# Patient Record
Sex: FEMALE | Race: WHITE | NOT HISPANIC OR LATINO | URBAN - METROPOLITAN AREA
[De-identification: names, ages, dates, MRNs, and addresses within clinical notes are randomized per-mention and may not be internally consistent; named-entity substitution may affect disease eponyms.]

---

## 2020-06-11 ENCOUNTER — LAB OUTSIDE AN ENCOUNTER (OUTPATIENT)
Dept: URBAN - METROPOLITAN AREA CLINIC 86 | Facility: CLINIC | Age: 70
End: 2020-06-11

## 2020-06-12 LAB
A/G RATIO: 1.8
ALBUMIN: 4.6
ALKALINE PHOSPHATASE: 88
ALT (SGPT): 25
AST (SGOT): 25
BASO (ABSOLUTE): 0
BASOS: 0
BILIRUBIN, TOTAL: 0.7
BUN/CREATININE RATIO: 25
BUN: 18
CALCIUM: 9.6
CARBON DIOXIDE, TOTAL: 26
CHLORIDE: 100
CREATININE: 0.72
EGFR IF AFRICN AM: 99
EGFR IF NONAFRICN AM: 86
EOS (ABSOLUTE): 0.2
EOS: 2
GLOBULIN, TOTAL: 2.6
GLUCOSE: 93
HEMATOCRIT: 36
HEMATOLOGY COMMENTS:: (no result)
HEMOGLOBIN: 12
IMMATURE CELLS: (no result)
IMMATURE GRANS (ABS): 0
IMMATURE GRANULOCYTES: 0
LYMPHS (ABSOLUTE): 2.1
LYMPHS: 30
MCH: 32.2
MCHC: 33.3
MCV: 97
MONOCYTES(ABSOLUTE): 0.9
MONOCYTES: 13
NEUTROPHILS (ABSOLUTE): 3.8
NEUTROPHILS: 55
NRBC: (no result)
PLATELETS: 225
POTASSIUM: 3.9
PROTEIN, TOTAL: 7.2
RBC: 3.73
RDW: 14
SODIUM: 141
WBC: 7.1

## 2020-06-17 ENCOUNTER — OFFICE VISIT (OUTPATIENT)
Dept: URBAN - METROPOLITAN AREA CLINIC 92 | Facility: CLINIC | Age: 70
End: 2020-06-17

## 2020-06-17 NOTE — HPI-TODAY'S VISIT:
This is a scheduled follow-up appointment for this patient, a 69 year old /White female, after a previous visit on 05 2019, for an evaluation for abnormal mri with liver lesions felt related to prior hormonal medication use.  The patient relates no significant family or personal history of liver disease. She states no history of new medications or alcohol use. The patient reports a personal history of no other habits that could cause liver damage.      Pt here for follow up. she is going to stop gleevac at the end of this year. she has one adenoma and 3 fnh's which have been stable since her last scan.  she was lost to follow up for 2 years.  her lesions are stable.  will plan for another mri in 1 year and labs at that time.   4/10/19 labs wbc 6.6 hgb 11.1 plt 198 gluc 100 cr 0.84 tb 0.6 alp 83 ast 20 alt 9 april 2019 mri  * Final Report *  Reason For Exam ADENOMA OF LIVER  REPORT EXAM: MRI Abdomen w/ + w/o Contrast  CLINICAL INDICATION: ADENOMA OF LIVER.  TECHNIQUE: Multisequence, multiplanar MRI of the abdomen was performed without and with intravenous contrast. ESRC.2.7.3   CONTRAST: 13 cc of Prohance  COMPARISON: 4/29/2017 and 10/15/2016  FINDINGS:  Lower Thorax: Normal.  Liver: No fat or iron. Normal hepatic morphology. No new worrisome hepatic mass. Lesions as follows:   1. Segment 8, stable arterially enhancing 0.5 x 0.6 cm lesion with delayed phase isointensity (series 10; image 19) post compatible with FNH. 2. Stable Segment 8 arterially enhancing lesion measuring 1.6 x 1.7 cm (series 10; image 24), previously 1.5 x 1.9 cm, with washout and with signal loss on out of phase imaging compatible with adenoma. 3. Stable segment 2 subcapsular arterial focus of enhancement 0.7 x 0.5 cm (series 10 image 29), likely a FNH.  4. Left lateral segment ill-defined focus of arterial enhancement which equilibrates on delayed imaging remains unchanged measuring 0.7 x 0.7 cm, (series 10; image 38), likely a FNH.  Gallbladder/Biliary Tree: Normal.  Spleen: Normal.  Pancreas: Normal.  Adrenal Glands: Normal.   Kidneys/Ureters: Subcentimeter cysts..  Gastrointestinal: Normal.   Lymph Nodes: Normal.  Vessels: Normal.  Peritoneum/Retroperitoneum: Normal.  Bones/Soft Tissues: Normal.  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   Signature Line *** Final ***  Electronically Signed By:  Nahomy Maldonado on  04/20/2019 12:20  Dictated by:  Nahomy Maldonado  Stressed to pt the need for social distancing and strict handwashing and wearing a mask and to follow any other new or added CDC recommendations as this is an evolving target.  Duration of visit 25 mins with over 50% of the time explaining pt's condition and treatment plan

## 2020-07-27 ENCOUNTER — OFFICE VISIT (OUTPATIENT)
Dept: URBAN - METROPOLITAN AREA TELEHEALTH 2 | Facility: TELEHEALTH | Age: 70
End: 2020-07-27
Payer: MEDICARE

## 2020-07-27 DIAGNOSIS — K82.8 GALLBLADDER SLUDGE: ICD-10-CM

## 2020-07-27 DIAGNOSIS — Z79.899 HIGH RISK MEDICATION USE: ICD-10-CM

## 2020-07-27 DIAGNOSIS — Z98.89 HX OF COLONOSCOPY: ICD-10-CM

## 2020-07-27 DIAGNOSIS — K76.89 LIVER LESION: ICD-10-CM

## 2020-07-27 DIAGNOSIS — E03.9 HYPOTHYROIDISM: ICD-10-CM

## 2020-07-27 DIAGNOSIS — E55.9 VITAMIN D DEFICIENCY: ICD-10-CM

## 2020-07-27 DIAGNOSIS — Z71.89 VACCINE COUNSELING: ICD-10-CM

## 2020-07-27 DIAGNOSIS — D13.4 ADENOMA OF LIVER: ICD-10-CM

## 2020-07-27 DIAGNOSIS — C92.10 CML (CHRONIC MYELOCYTIC LEUKEMIA): ICD-10-CM

## 2020-07-27 PROCEDURE — 3017F COLORECTAL CA SCREEN DOC REV: CPT

## 2020-07-27 PROCEDURE — G8427 DOCREV CUR MEDS BY ELIG CLIN: HCPCS

## 2020-07-27 PROCEDURE — 1036F TOBACCO NON-USER: CPT

## 2020-07-27 PROCEDURE — 99214 OFFICE O/P EST MOD 30 MIN: CPT

## 2020-07-27 PROCEDURE — G9903 PT SCRN TBCO ID AS NON USER: HCPCS

## 2020-07-27 PROCEDURE — G8420 CALC BMI NORM PARAMETERS: HCPCS

## 2020-07-27 RX ORDER — LEVOTHYROXINE SODIUM 88 UG/1
1 TABLET IN THE MORNING ON AN EMPTY STOMACH TABLET ORAL ONCE A DAY
Status: ACTIVE | COMMUNITY

## 2020-07-27 RX ORDER — IMATINIB MESYLATE 400 MG/1
TAKE 1 TABLET (400 MG) BY ORAL ROUTE ONCE DAILY WITH MEAL(S) AND A LARGE GLASS OF WATER TABLET ORAL 1
Qty: 0 | Refills: 0 | Status: DISCONTINUED | COMMUNITY
Start: 1900-01-01

## 2020-07-27 NOTE — HPI-TODAY'S VISIT:
This is a scheduled follow-up appointment for this patient, a 69 year old /White female, after a previous visit on 2019, for an evaluation for abnormal mri with liver lesions felt related to prior hormonal medication use.   The patient relates no significant family or personal history of liver disease. She states no history of new medications or alcohol use. The patient reports a personal history of no other habits that could cause liver damage.    Pt here for follow up. she is going to stop gleevac at the end of this year. she has one adenoma and 3 fnh's which have been stable since her last scan.  She was lost to follow up for 2 years.  Her lesions are stable.   2020 ca 9.6 and glu 93 and bun 18 and tp 7.2 alb 4.6 and tb 0.7 and alk 88 and ast 25 and k 3.9 and na 141 cl 100 and cr 0.72 and alt 25 and co2 26.  wbc 7.1 and hg 12 and plat 225.  No new meds and in fact off meds.  2019 and wbc 6.6 and hg 11.1 and plat 198 and glu 100 and cr 0.84 and na 140 and k 3.7 and cl 100 and co2 23 and alb 4.4 and tb 0.6 and alk 83 and ast 20 and alt 9.   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   Document Type:	MRI Abdomen w/ + w/o Contrast Document Date:	2020 07:45  Document Status:	Auth (Verified) Document Title:	MRI Abdomen w/ + w/o Contrast Performed By:	Aydin Crow  Verified By:	Shiv Dumont IV on Lexie 15, 2020 11:23  Encounter info:	[id number], [facility], Single Visit OP, 2020 - 2020   * Final Report *  Reason For Exam ADENOMA OF LIVER  REPORT EXAM: MRI Abdomen w/ + w/o Contrast  CLINICAL INDICATION: Adenoma of liver.  TECHNIQUE: Multisequence, multiplanar MRI of the abdomen was performed without and with intravenous contrast. ESRC.2.7.3  CONTRAST: 15 cc of Prohance  COMPARISON: MRI abdomen dated 2019 and 2017.  FINDINGS:  Lower Thorax: Normal.  Liver: No fat or iron. No new suspicious lesions.   Stable right hepatic lobe, segment 8 T1 hypointense and T2 hyperintense arterially enhancing lesion measuring 1.6 x 1.3 cm (8:35), previously measuring 1.7 x 1.6 cm.. The lesion demonstrates washout and dropout on out of phase imaging and is most consistent with a hepatic adenoma.   Multiple other bilobar arterially enhancing lesions/foci, similar to prior, without identifiable fat. For example:  Segment 6 arterially enhancing lesion measures approximately 1.8 x 1.3 cm (10:58) with central T2 intensity in T1 hypointensity (possibly FNH or a scar), previously measuring 1.8 x 1.2 cm. No definite intralesional fat. Hepatic dome measures 0.6 x 0.4 cm (10:29), previously measuring 0.6 x 0.5 cm. Segment 4A arterially enhancing focus measures 0.5 x 0.3 cm (10:38), previously measuring 0.6 x 0.5 cm.   Gallbladder/Biliary Tree: Gallbladder sludge.  Spleen: Normal.  Pancreas: Normal.  Adrenal Glands: Normal.   Kidneys/Ureters: Normal.  Gastrointestinal: Normal.   Lymph Nodes: Normal.  Vessels: Normal.  Peritoneum/Retroperitoneum: Normal.  Bones/Soft Tissues: Spine degenerative changes.  IMPRESSION:      1. 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  2. No significant change in multiple other bilobar arterially enhancing lesions likely representing FNH's.  The images were reviewed and interpreted by Shiv Dumont MD.  Signature Line *** Final ***  Electronically Signed By:  Shiv Dumont IV on  06/15/2020 11:23  Dictated by:  Aydin Crow  4/10/19 labs wbc 6.6 hgb 11.1 plt 198 gluc 100 cr 0.84 tb 0.6 alp 83 ast 20 alt 2019 mri  * Final Report *  Reason For Exam ADENOMA OF LIVER  REPORT EXAM: MRI Abdomen w/ + w/o Contrast  CLINICAL INDICATION: ADENOMA OF LIVER.  TECHNIQUE: Multisequence, multiplanar MRI of the abdomen was performed without and with intravenous contrast. ESRC.2.7.3   CONTRAST: 13 cc of Prohance  COMPARISON: 2017 and 10/15/2016  FINDINGS:  Lower Thorax: Normal.  Liver: No fat or iron. Normal hepatic morphology. No new worrisome hepatic mass. Lesions as follows:   1. Segment 8, stable arterially enhancing 0.5 x 0.6 cm lesion with delayed phase isointensity (series 10; image 19) post compatible with FNH. 2. Stable Segment 8 arterially enhancing lesion measuring 1.6 x 1.7 cm (series 10; image 24), previously 1.5 x 1.9 cm, with washout and with signal loss on out of phase imaging compatible with adenoma. 3. Stable segment 2 subcapsular arterial focus of enhancement 0.7 x 0.5 cm (series 10 image 29), likely a FNH.  4. Left lateral segment ill-defined focus of arterial enhancement which equilibrates on delayed imaging remains unchanged measuring 0.7 x 0.7 cm, (series 10; image 38), likely a FNH.  Gallbladder/Biliary Tree: Normal.  Spleen: Normal.  Pancreas: Normal.  Adrenal Glands: Normal.   Kidneys/Ureters: Subcentimeter cysts..  Gastrointestinal: Normal.   Lymph Nodes: Normal.  Vessels: Normal.  Peritoneum/Retroperitoneum: Normal.  Bones/Soft Tissues: Normal.  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   Signature Line *** Final ***  Electronically Signed By:  Nahomy Maldonado on  2019 12:20  Dictated by:  Nahomy Maldonado  Stressed to pt the need for social distancing and strict handwashing and wearing a mask and to follow any other new or added CDC recommendations as this is an evolving target.  Duration of visit 25 mins with over 50% of the time explaining pt's condition and treatment plan with the pt.   Attempts were made to use real-time two-way video technology for today's encounter. Due to a technological failure or access issues, telephone technology was used in lieu of an office visit due to the current COVID-19 pandemic crisis and need for social isolation.  Patient seen today via telehealth by agreement and consent of patient in light of current COVID-19 pandemic. I used video conferencing during the visit. The patient encounter is appropriate and reasonable under the circumstances given the patient's particular presentation at this time. The patient has been advised of the followin) the potential risks and limitations of this mode of treatment (including but not limited to the absence of in-person examination); 2) the right to refuse telehealth services at any point without affecting the right to future care; 3) the right to receive in-person services, included immediately after this consultation if an urgent need arises; 4) information, including identifiable images or information from this telehealth consult, will only be shared in accordance with HIPAA regulations. Any and all of the patient's and/or patient's family member's questions on this issue have been answered. The patient has verbally consented to be treated via telehealth services. The patient has also been advised to contact this office for worsening conditions or problems, and seek emergency medical treatment and/or call 911 if the patient deems either necessary.

## 2021-01-04 ENCOUNTER — LAB OUTSIDE AN ENCOUNTER (OUTPATIENT)
Dept: URBAN - METROPOLITAN AREA TELEHEALTH 2 | Facility: TELEHEALTH | Age: 71
End: 2021-01-04

## 2021-01-20 ENCOUNTER — TELEPHONE ENCOUNTER (OUTPATIENT)
Dept: URBAN - METROPOLITAN AREA CLINIC 92 | Facility: CLINIC | Age: 71
End: 2021-01-20

## 2021-01-20 ENCOUNTER — OFFICE VISIT (OUTPATIENT)
Dept: URBAN - METROPOLITAN AREA CLINIC 77 | Facility: CLINIC | Age: 71
End: 2021-01-20
Payer: MEDICARE

## 2021-01-20 DIAGNOSIS — K76.89 LESION OF LIVER: ICD-10-CM

## 2021-01-20 PROCEDURE — 93975 VASCULAR STUDY: CPT | Performed by: INTERNAL MEDICINE

## 2021-01-20 PROCEDURE — 76705 ECHO EXAM OF ABDOMEN: CPT | Performed by: INTERNAL MEDICINE

## 2021-01-20 RX ORDER — LEVOTHYROXINE SODIUM 88 UG/1
1 TABLET IN THE MORNING ON AN EMPTY STOMACH TABLET ORAL ONCE A DAY
Status: ACTIVE | COMMUNITY

## 2021-01-20 NOTE — HPI-OTHER HISTORIES
Dear Jane Corrigan,  Jan 20: u.s: the see liver vessels patent but they see hyperdynamic  portal venous waveforms and is nonspecific.  Right heart dysfunction suspected so recommend to see cardiologist or primary md to do echo to check this.  1.7cm lesion seen right lobe. Does not appear that they compared to a prior scan so need Rekha to get the [place] scan and drop off to them to get them to compare this. Gallbaldder unremarkable.  Common bile duct 2.9mm.  Imaged portions of pancreas unremarkable.  Tail partially obscured by gas.  Right kidney 10.4cm. Spleen 7cm. Prior mri [place] showed 1.6cm adenoma in seg 8 so likely same lesion and they just need to compare to this. Left you vmail re this.  Dr Franklin.

## 2021-01-21 ENCOUNTER — OFFICE VISIT (OUTPATIENT)
Dept: URBAN - METROPOLITAN AREA TELEHEALTH 2 | Facility: TELEHEALTH | Age: 71
End: 2021-01-21
Payer: MEDICARE

## 2021-01-21 DIAGNOSIS — C92.10 CML (CHRONIC MYELOCYTIC LEUKEMIA): ICD-10-CM

## 2021-01-21 DIAGNOSIS — Z79.899 HIGH RISK MEDICATION USE: ICD-10-CM

## 2021-01-21 DIAGNOSIS — R93.5 ABNORMAL ABDOMINAL ULTRASOUND: ICD-10-CM

## 2021-01-21 DIAGNOSIS — Z98.89 HX OF COLONOSCOPY: ICD-10-CM

## 2021-01-21 DIAGNOSIS — K82.8 GALLBLADDER SLUDGE: ICD-10-CM

## 2021-01-21 DIAGNOSIS — E03.9 HYPOTHYROIDISM: ICD-10-CM

## 2021-01-21 DIAGNOSIS — K76.89 LIVER LESION: ICD-10-CM

## 2021-01-21 DIAGNOSIS — E55.9 VITAMIN D DEFICIENCY: ICD-10-CM

## 2021-01-21 DIAGNOSIS — Z71.89 VACCINE COUNSELING: ICD-10-CM

## 2021-01-21 DIAGNOSIS — D13.4 ADENOMA OF LIVER: ICD-10-CM

## 2021-01-21 PROCEDURE — G8482 FLU IMMUNIZE ORDER/ADMIN: HCPCS

## 2021-01-21 PROCEDURE — G8420 CALC BMI NORM PARAMETERS: HCPCS

## 2021-01-21 PROCEDURE — G9903 PT SCRN TBCO ID AS NON USER: HCPCS

## 2021-01-21 PROCEDURE — G8427 DOCREV CUR MEDS BY ELIG CLIN: HCPCS

## 2021-01-21 PROCEDURE — 99214 OFFICE O/P EST MOD 30 MIN: CPT

## 2021-01-21 PROCEDURE — 1036F TOBACCO NON-USER: CPT

## 2021-01-21 PROCEDURE — 3017F COLORECTAL CA SCREEN DOC REV: CPT

## 2021-01-21 RX ORDER — LEVOTHYROXINE SODIUM 88 UG/1
1 TABLET IN THE MORNING ON AN EMPTY STOMACH TABLET ORAL ONCE A DAY
Status: ACTIVE | COMMUNITY

## 2021-01-21 RX ORDER — IMATINIB MESYLATE 400 MG/1
1 TABLET WITH A MEAL AND A LARGE GLASS OF WATER TABLET ORAL ONCE A DAY
Status: ACTIVE | COMMUNITY

## 2021-01-21 NOTE — HPI-TODAY'S VISIT:
This is a scheduled follow-up appointment for this patient, a 70 year old /White female, after a previous visit on July 2020 , for an evaluation for abnormal mri with liver lesions felt related to prior hormonal medication use.   The patient relates no significant family or personal history of liver disease. She states no history of new medications or alcohol use. The patient reports a personal history of no other habits that could cause liver damage.    Pt here for follow up. she is going to stop gleevac at the end of this year. she has one adenoma and 3 fnh's which have been stable since her last scan.  She was lost to follow up for 2 years.  Her lesions are stable.   She says 2 yrs cards did eval and found small water around heart and if any issues to come back.  Jan 20: u.s: the see liver vessels patent but they see hyperdynamic  portal venous waveforms and is nonspecific.  Right heart dysfunction suspected so recommend to see cardiologist or primary md to do echo to check this.  1.7cm lesion seen right lobe. Does not appear that they compared to a prior scan so need Rekha to get the [place] scan and drop off to them to get them to compare this. Gallbaldder unremarkable.  Common bile duct 2.9mm.  Imaged portions of pancreas unremarkable.  Tail partially obscured by gas.  Right kidney 10.4cm. Spleen 7cm. Prior mri [place] showed 1.6cm adenoma in seg 8 so likely same lesion and they just need to compare to this. Left you vmail re this.  She was doing this for gb and sludge and not see anything.  She did not do any labs for this.  June 11 2020 ca 9.6 and glu 93 and bun 18 and tp 7.2 alb 4.6 and tb 0.7 and alk 88 and ast 25 and k 3.9 and na 141 cl 100 and cr 0.72 and alt 25 and co2 26.  wbc 7.1 and hg 12 and plat 225.  No new meds and she did have to go back on gleevec.   She says does labs with the hematologist. Need those labs.  April 2019 and wbc 6.6 and hg 11.1 and plat 198 and glu 100 and cr 0.84 and na 140 and k 3.7 and cl 100 and co2 23 and alb 4.4 and tb 0.6 and alk 83 and ast 20 and alt 9.   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 She says it is back to zero.   Document Type:	MRI Abdomen w/ + w/o Contrast Document Date:	June 13, 2020 07:45  Document Status:	Auth (Verified) Document Title:	MRI Abdomen w/ + w/o Contrast Performed By:	Aydin Crow  Verified By:	Shiv Dumont IV on Lexie 15, 2020 11:23  Encounter info:	[id number], [facility], Single Visit OP, 6/13/2020 - 6/13/2020   * Final Report *  Reason For Exam ADENOMA OF LIVER  REPORT EXAM: MRI Abdomen w/ + w/o Contrast  CLINICAL INDICATION: Adenoma of liver.  TECHNIQUE: Multisequence, multiplanar MRI of the abdomen was performed without and with intravenous contrast. ESRC.2.7.3  CONTRAST: 15 cc of Prohance  COMPARISON: MRI abdomen dated 4/20/2019 and 4/30/2017.  FINDINGS:  Lower Thorax: Normal.  Liver: No fat or iron. No new suspicious lesions.   Stable right hepatic lobe, segment 8 T1 hypointense and T2 hyperintense arterially enhancing lesion measuring 1.6 x 1.3 cm (8:35), previously measuring 1.7 x 1.6 cm.. The lesion demonstrates washout and dropout on out of phase imaging and is most consistent with a hepatic adenoma.   Multiple other bilobar arterially enhancing lesions/foci, similar to prior, without identifiable fat. For example:  Segment 6 arterially enhancing lesion measures approximately 1.8 x 1.3 cm (10:58) with central T2 intensity in T1 hypointensity (possibly FNH or a scar), previously measuring 1.8 x 1.2 cm. No definite intralesional fat. Hepatic dome measures 0.6 x 0.4 cm (10:29), previously measuring 0.6 x 0.5 cm. Segment 4A arterially enhancing focus measures 0.5 x 0.3 cm (10:38), previously measuring 0.6 x 0.5 cm.   Gallbladder/Biliary Tree: Gallbladder sludge.  Spleen: Normal.  Pancreas: Normal.  Adrenal Glands: Normal.   Kidneys/Ureters: Normal.  Gastrointestinal: Normal.   Lymph Nodes: Normal.  Vessels: Normal.  Peritoneum/Retroperitoneum: Normal.  Bones/Soft Tissues: Spine degenerative changes.  IMPRESSION:      1. 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  2. No significant change in multiple other bilobar arterially enhancing lesions likely representing FNH's.  The images were reviewed and interpreted by Shiv Dumont MD.  Signature Line *** Final ***  Electronically Signed By:  Shiv Dumont IV on  06/15/2020 11:23  Dictated by:  Aydin Crow  4/10/19 labs wbc 6.6 hgb 11.1 plt 198 gluc 100 cr 0.84 tb 0.6 alp 83 ast 20 alt 9 April 2019 mri  * Final Report *  Reason For Exam ADENOMA OF LIVER  REPORT EXAM: MRI Abdomen w/ + w/o Contrast  CLINICAL INDICATION: ADENOMA OF LIVER.  TECHNIQUE: Multisequence, multiplanar MRI of the abdomen was performed without and with intravenous contrast. ESRC.2.7.3   CONTRAST: 13 cc of Prohance  COMPARISON: 4/29/2017 and 10/15/2016  FINDINGS:  Lower Thorax: Normal.  Liver: No fat or iron. Normal hepatic morphology. No new worrisome hepatic mass. Lesions as follows:   1. Segment 8, stable arterially enhancing 0.5 x 0.6 cm lesion with delayed phase isointensity (series 10; image 19) post compatible with FNH. 2. Stable Segment 8 arterially enhancing lesion measuring 1.6 x 1.7 cm (series 10; image 24), previously 1.5 x 1.9 cm, with washout and with signal loss on out of phase imaging compatible with adenoma. 3. Stable segment 2 subcapsular arterial focus of enhancement 0.7 x 0.5 cm (series 10 image 29), likely a FNH.  4. Left lateral segment ill-defined focus of arterial enhancement which equilibrates on delayed imaging remains unchanged measuring 0.7 x 0.7 cm, (series 10; image 38), likely a FNH.  Gallbladder/Biliary Tree: Normal.  Spleen: Normal.  Pancreas: Normal.  Adrenal Glands: Normal.   Kidneys/Ureters: Subcentimeter cysts..  Gastrointestinal: Normal.   Lymph Nodes: Normal.  Vessels: Normal.  Peritoneum/Retroperitoneum: Normal.  Bones/Soft Tissues: Normal.  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   Signature Line *** Final ***  Electronically Signed By:  Nahomy Maldonado on  04/20/2019 12:20  Dictated by:  Nahomy Maldonado  Plan: 1. She will see cardiology re the hyperdynamic flow to be sure that the right heart is ok and she has a baseline study 2 yrs ago that they can compare to. 2. We will get the pt to send us labs as she is back on her gleevec and need to watch it and not getting the labs. 3. Pt will do the mri in July but we will get the old summer 2020 mri sent to radiology to compare to. 4. She will see us sooner if needed if labs go up. 5. Labs pre mri for us.  Stressed to pt the need for social distancing and strict handwashing and wearing a mask and to follow any other new or added CDC recommendations as this is an evolving target.  Duration of visit  30 mins by healfloyd with over 50% of the time explaining pt's condition and treatment plan with the pt.

## 2021-02-01 ENCOUNTER — TELEPHONE ENCOUNTER (OUTPATIENT)
Dept: URBAN - METROPOLITAN AREA CLINIC 23 | Facility: CLINIC | Age: 71
End: 2021-02-01

## 2021-02-09 ENCOUNTER — TELEPHONE ENCOUNTER (OUTPATIENT)
Dept: URBAN - METROPOLITAN AREA CLINIC 92 | Facility: CLINIC | Age: 71
End: 2021-02-09

## 2021-02-09 NOTE — HPI-OTHER HISTORIES
Dear Jane Corrigan,  Prior mri submitted and they did addendum review: they confirmed 1.7cm lesion seen corresponded to similar sized lesion  on the prior mri of June 2020 and was stable in size. Prior mri felt this lesion was an adenoma.  Dr Franklin

## 2021-07-01 ENCOUNTER — LAB OUTSIDE AN ENCOUNTER (OUTPATIENT)
Dept: URBAN - METROPOLITAN AREA TELEHEALTH 2 | Facility: TELEHEALTH | Age: 71
End: 2021-07-01

## 2021-07-28 ENCOUNTER — TELEPHONE ENCOUNTER (OUTPATIENT)
Dept: URBAN - METROPOLITAN AREA CLINIC 92 | Facility: CLINIC | Age: 71
End: 2021-07-28

## 2021-07-28 NOTE — HPI-TODAY'S VISIT:
Dear Jane Corrigan, July 24 MRI came back today.  They compared it going back to the MRI of April 2017. Liver showed no fat or iron. No new suspicious lesion. Stable right lobe segment 8 lesion measuring 1.6 x 1.2 cm. Stable segment 6 lesion remains unchanged measuring 1.3 x 1.3 cm. Stable segment 4A lesion measuring 1.2 x 0.5 cm. Additional regions of arterial enhancement seen that were felt to be stable but no specific measurements given. Gallbladder and biliary tree normal. Spleen normal.  Pancreas normal.  Kidneys normal.  Degenerative changes seen of the spine. Overall they felt that the segment 8 lesion was stable and was compatible with an adenoma. They mention that there was no significant change in the other lesions and that they were likely felt to be focal nodular hyperplasias. Dr. Franklin

## 2021-08-25 ENCOUNTER — OFFICE VISIT (OUTPATIENT)
Dept: URBAN - METROPOLITAN AREA TELEHEALTH 2 | Facility: TELEHEALTH | Age: 71
End: 2021-08-25
Payer: MEDICARE

## 2021-08-25 VITALS — WEIGHT: 147 LBS | HEIGHT: 67 IN | BODY MASS INDEX: 23.07 KG/M2

## 2021-08-25 DIAGNOSIS — K76.89 LIVER LESION: ICD-10-CM

## 2021-08-25 DIAGNOSIS — C92.10 CML (CHRONIC MYELOCYTIC LEUKEMIA): ICD-10-CM

## 2021-08-25 DIAGNOSIS — R93.5 ABNORMAL ABDOMINAL ULTRASOUND: ICD-10-CM

## 2021-08-25 DIAGNOSIS — D13.4 ADENOMA OF LIVER: ICD-10-CM

## 2021-08-25 PROCEDURE — 99442 PHONE E/M BY PHYS 11-20 MIN: CPT | Performed by: PHYSICIAN ASSISTANT

## 2021-08-25 RX ORDER — LEVOTHYROXINE SODIUM 88 UG/1
1 TABLET IN THE MORNING ON AN EMPTY STOMACH TABLET ORAL ONCE A DAY
Status: ACTIVE | COMMUNITY

## 2021-08-25 RX ORDER — IMATINIB MESYLATE 400 MG/1
1 TABLET WITH A MEAL AND A LARGE GLASS OF WATER TABLET ORAL ONCE A DAY
Status: ACTIVE | COMMUNITY

## 2021-08-25 NOTE — HPI-TODAY'S VISIT:
This is a scheduled follow-up appointment for this patient, a 71 year old /White female, after a previous visit for an evaluation for abnormal mri with liver lesions felt related to prior hormonal medication  use.  The patient relates no significant family or personal history of liver disease. She states no history of new  medications or alcohol use. The patient reports a personal history of no other habits that could cause liver damage.    Pt here for follow up. now on gleevac again since labs were worsening. she has one adenoma and 3 fnh's which have been stable since her last scan. saw cards adn all is stable.  july 2021 labs: hgb 11.1, wbc 6.1, plt 210. cr 0.84, albumin 4.5, calcium low 8.6. ast 20, alt 10. alp 105, tb 0.4.    Prior mri submitted and they did addendum review: they confirmed 1.7cm lesion seen corresponded to similar sized lesion  on the prior mri of June 2020 and was stable in size. Prior mri felt this lesion was an adenoma.   july 2021 MRI: July 24 MRI came back today.  They compared it going back to the MRI of April 2017. Liver showed no fat or iron.  No new suspicious lesion. Stable right lobe segment 8 lesion measuring 1.6 x 1.2 cm. Stable segment 6 lesion remains unchanged measuring 1.3 x 1.3 cm. Stable segment 4A lesion measuring 1.2 x 0.5 cm. Additional regions of arterial enhancement seen that were felt to be stable but no specific measurements given. Gallbladder and biliary tree normal. Spleen normal.  Pancreas normal.  Kidneys normal.  Degenerative changes seen of the spine. Overall they felt that the segment 8 lesion was stable and was compatible with an adenoma. They mention that there was no significant change in the other lesions and that they were likely felt to be focal nodular hyperplasias.   RECAP: She says 2 yrs cards did eval and found small water around heart and if any issues to come back.  Jan 20: u.s: the see liver vessels patent but they see hyperdynamic  portal venous waveforms and is nonspecific.  Right heart dysfunction suspected so recommend to see cardiologist or primary md to do echo to check this.  1.7cm lesion seen right lobe. Does not appear that they compared to a prior scan so need Rekha to get the [place] scan and drop off to them to get them to compare this. Gallbaldder unremarkable.  Common bile duct 2.9mm.  Imaged portions of pancreas unremarkable.  Tail partially obscured by gas.  Right kidney 10.4cm. Spleen 7cm. Prior mri [place] showed 1.6cm adenoma in seg 8 so likely same lesion and they just need to compare to this. Left you vmail re this.  She was doing this for gb and sludge and not see anything.  She did not do any labs for this.  June 11 2020 ca 9.6 and glu 93 and bun 18 and tp 7.2 alb 4.6 and tb 0.7 and alk 88 and ast 25 and k 3.9 and na 141 cl 100 and cr 0.72 and alt 25 and co2 26.  wbc 7.1 and hg 12 and plat 225.  No new meds and she did have to go back on gleevec.   She says does labs with the hematologist. Need those labs.  April 2019 and wbc 6.6 and hg 11.1 and plat 198 and glu 100 and cr 0.84 and na 140 and k 3.7 and cl 100 and co2 23 and alb 4.4 and tb 0.6 and alk 83 and ast 20 and alt 9.   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 She says it is back to zero.   Document Type:	MRI Abdomen w/ + w/o Contrast Document Date:	June 13, 2020 07:45  Document Status:	Auth (Verified) Document Title:	MRI Abdomen w/ + w/o Contrast Performed By:	Aydin Crow  Verified By:	Shiv Dumont IV on Lexie 15, 2020 11:23  Encounter info:	[id number], [facility], Single Visit OP, 6/13/2020 - 6/13/2020   * Final Report *  Reason For Exam ADENOMA OF LIVER  REPORT EXAM: MRI Abdomen w/ + w/o Contrast  CLINICAL INDICATION: Adenoma of liver.  TECHNIQUE: Multisequence, multiplanar MRI of the abdomen was performed without and with intravenous contrast. ESRC.2.7.3  CONTRAST: 15 cc of Prohance  COMPARISON: MRI abdomen dated 4/20/2019 and 4/30/2017.  FINDINGS:  Lower Thorax: Normal.  Liver: No fat or iron. No new suspicious lesions.   Stable right hepatic lobe, segment 8 T1 hypointense and T2 hyperintense arterially enhancing lesion measuring 1.6 x 1.3 cm (8:35), previously measuring 1.7 x 1.6 cm.. The lesion demonstrates washout and dropout on out of phase imaging and is most consistent with a hepatic adenoma.   Multiple other bilobar arterially enhancing lesions/foci, similar to prior, without identifiable fat. For example:  Segment 6 arterially enhancing lesion measures approximately 1.8 x 1.3 cm (10:58) with central T2 intensity in T1 hypointensity (possibly FNH or a scar), previously measuring 1.8 x 1.2 cm. No definite intralesional fat. Hepatic dome measures 0.6 x 0.4 cm (10:29), previously measuring 0.6 x 0.5 cm. Segment 4A arterially enhancing focus measures 0.5 x 0.3 cm (10:38), previously measuring 0.6 x 0.5 cm.   Gallbladder/Biliary Tree: Gallbladder sludge.  Spleen: Normal.  Pancreas: Normal.  Adrenal Glands: Normal.   Kidneys/Ureters: Normal.  Gastrointestinal: Normal.   Lymph Nodes: Normal.  Vessels: Normal.  Peritoneum/Retroperitoneum: Normal.  Bones/Soft Tissues: Spine degenerative changes.  IMPRESSION:      1. 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  2. No significant change in multiple other bilobar arterially enhancing lesions likely representing FNH's.  The images were reviewed and interpreted by Shiv Dumont MD.  Signature Line *** Final ***  Electronically Signed By:  Shiv Dumont IV on  06/15/2020 11:23  Dictated by:  Aydin Crow

## 2022-05-18 ENCOUNTER — TELEPHONE ENCOUNTER (OUTPATIENT)
Dept: URBAN - METROPOLITAN AREA CLINIC 23 | Facility: CLINIC | Age: 72
End: 2022-05-18

## 2022-06-15 ENCOUNTER — TELEPHONE ENCOUNTER (OUTPATIENT)
Dept: URBAN - METROPOLITAN AREA CLINIC 86 | Facility: CLINIC | Age: 72
End: 2022-06-15

## 2022-08-17 ENCOUNTER — OFFICE VISIT (OUTPATIENT)
Dept: URBAN - METROPOLITAN AREA TELEHEALTH 2 | Facility: TELEHEALTH | Age: 72
End: 2022-08-17

## 2022-08-25 ENCOUNTER — TELEPHONE ENCOUNTER (OUTPATIENT)
Dept: URBAN - METROPOLITAN AREA CLINIC 118 | Facility: CLINIC | Age: 72
End: 2022-08-25

## 2022-08-25 ENCOUNTER — LAB OUTSIDE AN ENCOUNTER (OUTPATIENT)
Dept: URBAN - METROPOLITAN AREA TELEHEALTH 2 | Facility: TELEHEALTH | Age: 72
End: 2022-08-25

## 2022-08-26 ENCOUNTER — TELEPHONE ENCOUNTER (OUTPATIENT)
Dept: URBAN - METROPOLITAN AREA CLINIC 86 | Facility: CLINIC | Age: 72
End: 2022-08-26

## 2022-08-26 LAB
A/G RATIO: 2
ALBUMIN: 4.3
ALKALINE PHOSPHATASE: 77
ALT (SGPT): 13
AST (SGOT): 20
BASO (ABSOLUTE): 0
BASOS: 0
BILIRUBIN, TOTAL: 0.5
BUN/CREATININE RATIO: 17
BUN: 15
CALCIUM: 8.6
CARBON DIOXIDE, TOTAL: 26
CHLORIDE: 101
CREATININE: 0.87
EGFR: 71
EOS (ABSOLUTE): 0.1
EOS: 2
GLOBULIN, TOTAL: 2.1
GLUCOSE: 97
HEMATOCRIT: 31.1
HEMATOLOGY COMMENTS:: (no result)
HEMOGLOBIN: 10.4
IMMATURE CELLS: (no result)
IMMATURE GRANS (ABS): 0
IMMATURE GRANULOCYTES: 0
LYMPHS (ABSOLUTE): 1.8
LYMPHS: 35
MCH: 32.6
MCHC: 33.4
MCV: 98
MONOCYTES(ABSOLUTE): 0.4
MONOCYTES: 8
NEUTROPHILS (ABSOLUTE): 2.8
NEUTROPHILS: 55
NRBC: (no result)
PLATELETS: 191
POTASSIUM: 3.4
PROTEIN, TOTAL: 6.4
RBC: 3.19
RDW: 12.3
SODIUM: 140
WBC: 5.2

## 2022-08-26 NOTE — HPI-TODAY'S VISIT:
Glucose 97, creatinine 0.87, sodium 140, potassium 3.4, calcium 8.6, albumin 4.3, bilirubin 0.5, alkaline phosphatase 77, AST 29 ALT 13.  The potassium and calcium are both slightly decreased please share with your primary care provider.  The white blood cell count is 5.2 red blood cell count 3.19, hemoglobin 10.4, hematocrit 31.1, MCV 98, platelets 191, neutrophils 2.8 lymphocytes 1.8.  The hemoglobin and red blood cell count is low and we will discuss this at your visit and also you need to share this with your primary care.

## 2022-08-28 ENCOUNTER — TELEPHONE ENCOUNTER (OUTPATIENT)
Dept: URBAN - METROPOLITAN AREA CLINIC 92 | Facility: CLINIC | Age: 72
End: 2022-08-28

## 2022-08-28 NOTE — HPI-TODAY'S VISIT:
Dear Jane Corrigan, August 27 MRI read out today. Limited images through the lung bases appear unremarkable. Liver showed no significant diffuse fat or iron deposition. There is a 1.6 x 1.5 cm lesion in the right lobe that has some fat deposition and is favored to be an adenoma and is similar to more remote MRI of 2012 when it measured 1.3 x 1.5 cm. Scattered throughout the liver are other arterial phase hyperenhancing lesions for example 1.5 x 0.7 cm lesion in the right lobe which is similar to the most recent MRI.  A 0.6 cm area seen in the right lobe also similar to most recent MRI. Gallbladder was present and has some sludge versus concentrated bile in it.  No bile duct dilation seen. Spleen normal at 6.8 cm. Pancreas shows no focal lesion. Adrenal glands normal. Extrarenal pelvis seen to the right kidney and a few subcentimeter renal cysts also seen. Subcentimeter lymph nodes seen. Liver vessels patent. Degenerative changes seen of the spine. Overall they felt that you had similar to prior hyperenhancing lesions which are a combination of adenomas and FNH lesions and/or perfusional changes. We can discuss surveillance but have been doing the mris at 1 year intervals due to the stability seen. Dr. Franklin

## 2022-08-31 ENCOUNTER — OFFICE VISIT (OUTPATIENT)
Dept: URBAN - METROPOLITAN AREA TELEHEALTH 2 | Facility: TELEHEALTH | Age: 72
End: 2022-08-31
Payer: MEDICARE

## 2022-08-31 VITALS — HEIGHT: 67 IN | WEIGHT: 147 LBS | BODY MASS INDEX: 23.07 KG/M2

## 2022-08-31 DIAGNOSIS — C92.10 CML (CHRONIC MYELOCYTIC LEUKEMIA): ICD-10-CM

## 2022-08-31 DIAGNOSIS — R93.5 ABNORMAL ABDOMINAL ULTRASOUND: ICD-10-CM

## 2022-08-31 DIAGNOSIS — D13.4 ADENOMA OF LIVER: ICD-10-CM

## 2022-08-31 DIAGNOSIS — K76.89 LIVER LESION: ICD-10-CM

## 2022-08-31 PROBLEM — 15633921000119109: Status: ACTIVE | Noted: 2021-01-21

## 2022-08-31 PROBLEM — 92818009: Status: ACTIVE | Noted: 2020-07-27

## 2022-08-31 PROBLEM — 27123005: Status: ACTIVE | Noted: 2020-07-27

## 2022-08-31 PROCEDURE — 99214 OFFICE O/P EST MOD 30 MIN: CPT

## 2022-08-31 RX ORDER — IMATINIB MESYLATE 400 MG/1
1 TABLET WITH A MEAL AND A LARGE GLASS OF WATER TABLET ORAL ONCE A DAY
Status: ACTIVE | COMMUNITY

## 2022-08-31 RX ORDER — LEVOTHYROXINE SODIUM 88 UG/1
1 TABLET IN THE MORNING ON AN EMPTY STOMACH TABLET ORAL ONCE A DAY
Status: ACTIVE | COMMUNITY

## 2022-08-31 RX ORDER — CHOLECALCIFEROL (VITAMIN D3) 50 MCG
1 TABLET CAPSULE ORAL ONCE A DAY
Status: ACTIVE | COMMUNITY

## 2022-08-31 RX ORDER — PHENOL 1.4 %
AS DIRECTED AEROSOL, SPRAY (ML) MUCOUS MEMBRANE QD
Status: ACTIVE | COMMUNITY

## 2022-08-31 NOTE — HPI-TODAY'S VISIT:
This is a scheduled follow-up appointment for this patient, a 72 year old /White female, after a previous visit Aug 2021 for an evaluation for abnormal mri with liver lesions felt related to prior hormonal medication  use.    Pt here for follow up.  Still is n gleevac again and she is responding well and no signs of issues.  August 27 MRI  Limited images through the lung bases appear unremarkable. Liver showed no significant diffuse fat or iron deposition. There is a 1.6 x 1.5 cm lesion in the right lobe that has some fat deposition and is favored to be an adenoma and is similar to more remote MRI of 2012 when it measured 1.3 x 1.5 cm. Scattered throughout the liver are other arterial phase hyperenhancing lesions for example 1.5 x 0.7 cm lesion in the right lobe which is similar to the most recent MRI.  A 0.6 cm area seen in the right lobe also similar to most recent MRI. Gallbladder was present and has some sludge versus concentrated bile in it.  No bile duct dilation seen. Spleen normal at 6.8 cm. Pancreas shows no focal lesion. Adrenal glands normal. Extrarenal pelvis seen to the right kidney and a few subcentimeter renal cysts also seen. Subcentimeter lymph nodes seen. Liver vessels patent. Degenerative changes seen of the spine. Overall they felt that you had similar to prior hyperenhancing lesions which are a combination of adenomas and FNH lesions and/or perfusional changes. We can discuss surveillance but have been doing the mris at 1 year intervals due to the stability seen. Aug  Glucose 97, creatinine 0.87, sodium 140, potassium 3.4, calcium 8.6, albumin 4.3, bilirubin 0.5, alkaline phosphatase 77, AST 29 ALT 13.  The potassium and calcium are both slightly decreased please share with your primary care provider.The white blood cell count is 5.2 red blood cell count 3.19, hemoglobin 10.4, hematocrit 31.1, MCV 98, platelets 191, neutrophils 2.8 lymphocytes 1.8.  The hemoglobin and red blood cell count is low and we will discuss this at your visit and also you need to share this with your primary care.  She is not on a water pill and so may be due to gleevec.   july 2021 labs: hgb 11.1, wbc 6.1, plt 210. cr 0.84, albumin 4.5, calcium low 8.6. ast 20, alt 10. alp 105, tb 0.4.    Prior mri submitted and they did addendum review: they confirmed 1.7cm lesion seen corresponded to similar sized lesion  on the prior mri of June 2020 and was stable in size. Prior mri felt this lesion was an adenoma.   july 2021 MRI: July 24 MRI came back today.  They compared it going back to the MRI of April 2017. Liver showed no fat or iron.  No new suspicious lesion. Stable right lobe segment 8 lesion measuring 1.6 x 1.2 cm. Stable segment 6 lesion remains unchanged measuring 1.3 x 1.3 cm. Stable segment 4A lesion measuring 1.2 x 0.5 cm. Additional regions of arterial enhancement seen that were felt to be stable but no specific measurements given. Gallbladder and biliary tree normal. Spleen normal.  Pancreas normal.  Kidneys normal.  Degenerative changes seen of the spine. Overall they felt that the segment 8 lesion was stable and was compatible with an adenoma. They mention that there was no significant change in the other lesions and that they were likely felt to be focal nodular hyperplasias.   Jan 20: u.s: the see liver vessels patent but they see hyperdynamic  portal venous waveforms and is nonspecific.  Right heart dysfunction suspected so recommend to see cardiologist or primary md to do echo to check this.  1.7cm lesion seen right lobe. Does not appear that they compared to a prior scan so need Rekha to get the [place] scan and drop off to them to get them to compare this. Gallbaldder unremarkable.  Common bile duct 2.9mm.  Imaged portions of pancreas unremarkable.  Tail partially obscured by gas.  Right kidney 10.4cm. Spleen 7cm. Prior mri [place] showed 1.6cm adenoma in seg 8 so likely same lesion and they just need to compare to this. Left you vmail re this.  She says cards saw her and was ok. Sees Jan 2023.  She was doing this for gb and sludge and not see anything.  June 11 2020 ca 9.6 and glu 93 and bun 18 and tp 7.2 alb 4.6 and tb 0.7 and alk 88 and ast 25 and k 3.9 and na 141 cl 100 and cr 0.72 and alt 25 and co2 26.  wbc 7.1 and hg 12 and plat 225.   April 2019 and wbc 6.6 and hg 11.1 and plat 198 and glu 100 and cr 0.84 and na 140 and k 3.7 and cl 100 and co2 23 and alb 4.4 and tb 0.6 and alk 83 and ast 20 and alt 9.   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 She says it is back to zero.   Document Type:	MRI Abdomen w/ + w/o Contrast Document Date:	June 13, 2020 07:45  Document Status:	Auth (Verified) Document Title:	MRI Abdomen w/ + w/o Contrast Performed By:	Aydin Crow  Verified By:	Shiv Dumont IV on Lexie 15, 2020 11:23  Encounter info:	[id number], [facility], Single Visit OP, 6/13/2020 - 6/13/2020   * Final Report *  Reason For Exam ADENOMA OF LIVER  REPORT EXAM: MRI Abdomen w/ + w/o Contrast  CLINICAL INDICATION: Adenoma of liver.  TECHNIQUE: Multisequence, multiplanar MRI of the abdomen was performed without and with intravenous contrast. ESRC.[id number]  CONTRAST: 15 cc of Prohance  COMPARISON: MRI abdomen dated 4/20/2019 and 4/30/2017.  FINDINGS:  Lower Thorax: Normal.  Liver: No fat or iron. No new suspicious lesions.   Stable right hepatic lobe, segment 8 T1 hypointense and T2 hyperintense arterially enhancing lesion measuring 1.6 x 1.3 cm (8:35), previously measuring 1.7 x 1.6 cm.. The lesion demonstrates washout and dropout on out of phase imaging and is most consistent with a hepatic adenoma.   Multiple other bilobar arterially enhancing lesions/foci, similar to prior, without identifiable fat. For example:  Segment 6 arterially enhancing lesion measures approximately 1.8 x 1.3 cm (10:58) with central T2 intensity in T1 hypointensity (possibly FNH or a scar), previously measuring 1.8 x 1.2 cm. No definite intralesional fat. Hepatic dome measures 0.6 x 0.4 cm (10:29), previously measuring 0.6 x 0.5 cm. Segment 4A arterially enhancing focus measures 0.5 x 0.3 cm (10:38), previously measuring 0.6 x 0.5 cm.   Gallbladder/Biliary Tree: Gallbladder sludge.  Spleen: Normal.  Pancreas: Normal.  Adrenal Glands: Normal.   Kidneys/Ureters: Normal.  Gastrointestinal: Normal.   Lymph Nodes: Normal.  Vessels: Normal.  Peritoneum/Retroperitoneum: Normal.  Bones/Soft Tissues: Spine degenerative changes.  IMPRESSION:      1. 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  2. No significant change in multiple other bilobar arterially enhancing lesions likely representing FNH's.  The images were reviewed and interpreted by Shiv Dumont MD.  Signature Line *** Final ***  Electronically Signed By:  Shiv Dumont IV on  06/15/2020 11:23  Dictated by:  Aydin Crow  Plan: 1. Labs and mri in a year. 2. She will be watched on her gleevec by cards as recurred when off it for 6m. 3. Pt will see us in 1 yr.   Stressed to pt the need for social distancing and strict handwashing and wearing a mask and to follow any other new or added CDC recommendations as this is an evolving target.  Duration of the visit was 33 min with 10 min of chart prep reviewing data and information that was available for the visit and setting up in ecw and then an additional 23 min for the [facility] telemed visit today with time spent reviewing said material and their clinical correlates and then with this information being used to formulate a treatment plan.

## 2023-08-01 ENCOUNTER — LAB OUTSIDE AN ENCOUNTER (OUTPATIENT)
Dept: URBAN - METROPOLITAN AREA TELEHEALTH 2 | Facility: TELEHEALTH | Age: 73
End: 2023-08-01

## 2023-08-07 ENCOUNTER — LAB OUTSIDE AN ENCOUNTER (OUTPATIENT)
Dept: URBAN - METROPOLITAN AREA TELEHEALTH 2 | Facility: TELEHEALTH | Age: 73
End: 2023-08-07

## 2023-08-15 ENCOUNTER — LAB OUTSIDE AN ENCOUNTER (OUTPATIENT)
Dept: URBAN - METROPOLITAN AREA CLINIC 86 | Facility: CLINIC | Age: 73
End: 2023-08-15

## 2023-08-16 ENCOUNTER — TELEPHONE ENCOUNTER (OUTPATIENT)
Dept: URBAN - METROPOLITAN AREA CLINIC 86 | Facility: CLINIC | Age: 73
End: 2023-08-16

## 2023-08-16 LAB
A/G RATIO: 1.7
ABSOLUTE BASOPHILS: 23
ABSOLUTE EOSINOPHILS: 51
ABSOLUTE LYMPHOCYTES: 1573
ABSOLUTE MONOCYTES: 439
ABSOLUTE NEUTROPHILS: 3614
ALBUMIN: 4.1
ALKALINE PHOSPHATASE: 75
ALT (SGPT): 16
AST (SGOT): 24
BASOPHILS: 0.4
BILIRUBIN, TOTAL: 0.7
BUN/CREATININE RATIO: (no result)
BUN: 13
CALCIUM: 8.6
CARBON DIOXIDE, TOTAL: 28
CHLORIDE: 103
CREATININE: 0.83
EGFR: 75
EOSINOPHILS: 0.9
GLOBULIN, TOTAL: 2.4
GLUCOSE: 94
HEMATOCRIT: 32.8
HEMOGLOBIN: 10.9
LYMPHOCYTES: 27.6
MCH: 32.7
MCHC: 33.2
MCV: 98.5
MONOCYTES: 7.7
MPV: 10.9
NEUTROPHILS: 63.4
PLATELET COUNT: 152
POTASSIUM: 3.6
PROTEIN, TOTAL: 6.5
RDW: 12.4
RED BLOOD CELL COUNT: 3.33
SODIUM: 142
WHITE BLOOD CELL COUNT: 5.7

## 2023-08-16 NOTE — HPI-TODAY'S VISIT:
Dear Jane Corrigan, August 15 labs show glucose 94, BUN of 13, creatinine 0.3 sodium 142, potassium 3.6, chloride 103, CO2 of 28, calcium 8.6, albumin 4.1, bilirubin 0.7, alk phos 75, AST 24 and ALT of 16.  Previously AST 20 and ALT 13 and ideal ALT less than 25 WBC 5.7 normal but RBC low at 3.33.  Up from 3.19 last year. Hemoglobin low at 10.9 but up from 10.4 in August of last year.  MCV normal 98.5.  Plate count 1-2 normal. Neutrophils normal at 3614 and lymphocytes 1573.  We look forward to seeing you at the visit.   Please share labs with local providers.  Dr rFanklin

## 2023-08-22 ENCOUNTER — TELEPHONE ENCOUNTER (OUTPATIENT)
Dept: URBAN - METROPOLITAN AREA CLINIC 86 | Facility: CLINIC | Age: 73
End: 2023-08-22

## 2023-08-22 NOTE — HPI-TODAY'S VISIT:
Boaz Corrigan, August 21 MRI was compared to August 2022 MRI in July 2021 MRI. Lower thorax was normal. Liver showed no significant fat or iron deposition. He had a stable segment 7/8 1.9 x 1.5 cm lesion with mild washout and mild signal dropout in a stable segment eight 1.3 x 1.9 cm lesion as well.  An additional stable segment 8 -  0.6 cm lesion was seen as well. You also had some layering sludge in your gallbladder. The spleen, pancreas, and adrenal glands were normal. Kidney showed an extrarenal pelvis on the right kidney. Gastrointestinal views, lymph node views, and vessels were normal. Peritoneum and retroperitoneum were normal as were soft tissues and bone views. In summary, you had a 7/8 stable hepatic hemangioma and stable other segment 8 hyperenhancing lesions which they thought could be FNH versus perfusional or less likely adenomas. We can discuss at the visit repeating the scan again in a year or longer? Dr. Franklin

## 2023-08-31 ENCOUNTER — OFFICE VISIT (OUTPATIENT)
Dept: URBAN - METROPOLITAN AREA TELEHEALTH 2 | Facility: TELEHEALTH | Age: 73
End: 2023-08-31
Payer: MEDICARE

## 2023-08-31 ENCOUNTER — DASHBOARD ENCOUNTERS (OUTPATIENT)
Age: 73
End: 2023-08-31

## 2023-08-31 VITALS — HEIGHT: 67 IN | WEIGHT: 138 LBS | BODY MASS INDEX: 21.66 KG/M2

## 2023-08-31 DIAGNOSIS — E03.9 HYPOTHYROIDISM: ICD-10-CM

## 2023-08-31 DIAGNOSIS — K82.8 GALLBLADDER SLUDGE: ICD-10-CM

## 2023-08-31 DIAGNOSIS — K76.89 LIVER LESION: ICD-10-CM

## 2023-08-31 DIAGNOSIS — R93.5 ABNORMAL ABDOMINAL ULTRASOUND: ICD-10-CM

## 2023-08-31 DIAGNOSIS — Z71.89 VACCINE COUNSELING: ICD-10-CM

## 2023-08-31 DIAGNOSIS — E55.9 VITAMIN D DEFICIENCY: ICD-10-CM

## 2023-08-31 DIAGNOSIS — Z79.899 HIGH RISK MEDICATION USE: ICD-10-CM

## 2023-08-31 DIAGNOSIS — C92.10 CML (CHRONIC MYELOCYTIC LEUKEMIA): ICD-10-CM

## 2023-08-31 DIAGNOSIS — D13.4 ADENOMA OF LIVER: ICD-10-CM

## 2023-08-31 PROCEDURE — 99214 OFFICE O/P EST MOD 30 MIN: CPT

## 2023-08-31 RX ORDER — PHENOL 1.4 %
AS DIRECTED AEROSOL, SPRAY (ML) MUCOUS MEMBRANE QD
Status: ACTIVE | COMMUNITY

## 2023-08-31 RX ORDER — LEVOTHYROXINE SODIUM 88 UG/1
1 TABLET IN THE MORNING ON AN EMPTY STOMACH TABLET ORAL ONCE A DAY
Status: ACTIVE | COMMUNITY

## 2023-08-31 RX ORDER — IMATINIB MESYLATE 400 MG/1
1 TABLET WITH A MEAL AND A LARGE GLASS OF WATER TABLET ORAL ONCE A DAY
Status: ACTIVE | COMMUNITY

## 2023-08-31 RX ORDER — CHOLECALCIFEROL (VITAMIN D3) 50 MCG
1 TABLET CAPSULE ORAL ONCE A DAY
Status: ACTIVE | COMMUNITY

## 2023-08-31 NOTE — HPI-TODAY'S VISIT:
Pt is 73 year old /White female, after a previous visit Aug 2022 for an evaluation for abnormal mri with liver lesions felt related to prior hormonal medication  use.    Pt here for follow up.  Still is n gleevac again and she is responding well and no signs of issues.  August 21 2023 MRI was compared to August 2022 MRI in July 2021 MRI. Lower thorax was normal. Liver showed no significant fat or iron deposition. SHe had a stable segment 7/8 1.9 x 1.5 cm lesion with mild washout and mild signal dropout in a stable segment eight 1.3 x 1.9 cm lesion as well.  An additional stable segment 8 -  0.6 cm lesion was seen as well. You also had some layering sludge in your gallbladder. The spleen, pancreas, and adrenal glands were normal. Kidney showed an extrarenal pelvis on the right kidney. Gastrointestinal views, lymph node views, and vessels were normal. Peritoneum and retroperitoneum were normal as were soft tissues and bone views. In summary, you had a 7/8 stable hepatic hemangioma and stable other segment 8 hyperenhancing lesions which they thought could be FNH versus perfusional or less likely adenomas.  Off the ocp 14-15 yrs.  August 15 labs show glucose 94, BUN of 13, creatinine 0.3 sodium 142, potassium 3.6, chloride 103, CO2 of 28, calcium 8.6, albumin 4.1, bilirubin 0.7, alk phos 75, AST 24 and ALT of 16.  Previously AST 20 and ALT 13 and ideal ALT less than 25 WBC 5.7 normal but RBC low at 3.33.  Up from 3.19 last year. Hemoglobin low at 10.9 but up from 10.4 in August of last year.  MCV normal 98.5.  Plate count 1-2 normal. Neutrophils normal at 3614 and lymphocytes 1573.  We look forward to seeing you at the visit.   Please share labs with local providers.  CML is being managed by Dr Laureano as Dr Hernandez retired.  August 27 202 MRI  Limited images through the lung bases appear unremarkable. Liver showed no significant diffuse fat or iron deposition. There is a 1.6 x 1.5 cm lesion in the right lobe that has some fat deposition and is favored to be an adenoma and is similar to more remote MRI of 2012 when it measured 1.3 x 1.5 cm. Scattered throughout the liver are other arterial phase hyperenhancing lesions for example 1.5 x 0.7 cm lesion in the right lobe which is similar to the most recent MRI.  A 0.6 cm area seen in the right lobe also similar to most recent MRI. Gallbladder was present and has some sludge versus concentrated bile in it.  No bile duct dilation seen. Spleen normal at 6.8 cm. Pancreas shows no focal lesion. Adrenal glands normal. Extrarenal pelvis seen to the right kidney and a few subcentimeter renal cysts also seen. Subcentimeter lymph nodes seen. Liver vessels patent. Degenerative changes seen of the spine. Overall they felt that you had similar to prior hyperenhancing lesions which are a combination of adenomas and FNH lesions and/or perfusional changes. We can discuss surveillance but have been doing the mris at 1 year intervals due to the stability seen.  Aug  Glucose 97, creatinine 0.87, sodium 140, potassium 3.4, calcium 8.6, albumin 4.3, bilirubin 0.5, alkaline phosphatase 77, AST 29 ALT 13.  The potassium and calcium are both slightly decreased please share with your primary care provider.The white blood cell count is 5.2 red blood cell count 3.19, hemoglobin 10.4, hematocrit 31.1, MCV 98, platelets 191, neutrophils 2.8 lymphocytes 1.8.  The hemoglobin and red blood cell count is low and we will discuss this at your visit and also you need to share this with your primary care.  She is not on a water pill and so may be due to gleevec.   july 2021 labs: hgb 11.1, wbc 6.1, plt 210. cr 0.84, albumin 4.5, calcium low 8.6. ast 20, alt 10. alp 105, tb 0.4.    Prior mri submitted and they did addendum review: they confirmed 1.7cm lesion seen corresponded to similar sized lesion  on the prior mri of June 2020 and was stable in size. Prior mri felt this lesion was an adenoma.   july 2021 MRI: July 24 MRI came back today.  They compared it going back to the MRI of April 2017. Liver showed no fat or iron.  No new suspicious lesion. Stable right lobe segment 8 lesion measuring 1.6 x 1.2 cm. Stable segment 6 lesion remains unchanged measuring 1.3 x 1.3 cm. Stable segment 4A lesion measuring 1.2 x 0.5 cm. Additional regions of arterial enhancement seen that were felt to be stable but no specific measurements given. Gallbladder and biliary tree normal. Spleen normal.  Pancreas normal.  Kidneys normal.  Degenerative changes seen of the spine. Overall they felt that the segment 8 lesion was stable and was compatible with an adenoma. They mention that there was no significant change in the other lesions and that they were likely felt to be focal nodular hyperplasias.   Jan 20: u.s: the see liver vessels patent but they see hyperdynamic  portal venous waveforms and is nonspecific.  Right heart dysfunction suspected so recommend to see cardiologist or primary md to do echo to check this.  1.7cm lesion seen right lobe. Does not appear that they compared to a prior scan so need Rekha to get the [place] scan and drop off to them to get them to compare this. Gallbaldder unremarkable.  Common bile duct 2.9mm.  Imaged portions of pancreas unremarkable.  Tail partially obscured by gas.  Right kidney 10.4cm. Spleen 7cm. Prior mri [place] showed 1.6cm adenoma in seg 8 so likely same lesion and they just need to compare to this. Left you vmail re this.  She says cards saw her and was ok. Sees Jan 2023.   June 11 2020 ca 9.6 and glu 93 and bun 18 and tp 7.2 alb 4.6 and tb 0.7 and alk 88 and ast 25 and k 3.9 and na 141 cl 100 and cr 0.72 and alt 25 and co2 26.  wbc 7.1 and hg 12 and plat 225.   April 2019 and wbc 6.6 and hg 11.1 and plat 198 and glu 100 and cr 0.84 and na 140 and k 3.7 and cl 100 and co2 23 and alb 4.4 and tb 0.6 and alk 83 and ast 20 and alt 9.   Gleevec was on for 7 yrs, She prior had stopped the gleevec in May 2020 and then told could wait or start back and she started back and staying on it and not detecting.   Document Type:	MRI Abdomen w/ + w/o Contrast Document Date:	June 13, 2020 07:45  Document Status:	Auth (Verified) Document Title:	MRI Abdomen w/ + w/o Contrast Performed By:	Aydin Crow  Verified By:	Shiv Dumont IV on Lexie 15, 2020 11:23  Encounter info:	[id number], [facility], Single Visit OP, 6/13/2020 - 6/13/2020   * Final Report *  Reason For Exam ADENOMA OF LIVER  REPORT EXAM: MRI Abdomen w/ + w/o Contrast  CLINICAL INDICATION: Adenoma of liver.  TECHNIQUE: Multisequence, multiplanar MRI of the abdomen was performed without and with intravenous contrast. ESRC.2.7.3  CONTRAST: 15 cc of Prohance  COMPARISON: MRI abdomen dated 4/20/2019 and 4/30/2017.  FINDINGS:  Lower Thorax: Normal.  Liver: No fat or iron. No new suspicious lesions.   Stable right hepatic lobe, segment 8 T1 hypointense and T2 hyperintense arterially enhancing lesion measuring 1.6 x 1.3 cm (8:35), previously measuring 1.7 x 1.6 cm.. The lesion demonstrates washout and dropout on out of phase imaging and is most consistent with a hepatic adenoma.   Multiple other bilobar arterially enhancing lesions/foci, similar to prior, without identifiable fat. For example:  Segment 6 arterially enhancing lesion measures approximately 1.8 x 1.3 cm (10:58) with central T2 intensity in T1 hypointensity (possibly FNH or a scar), previously measuring 1.8 x 1.2 cm. No definite intralesional fat. Hepatic dome measures 0.6 x 0.4 cm (10:29), previously measuring 0.6 x 0.5 cm. Segment 4A arterially enhancing focus measures 0.5 x 0.3 cm (10:38), previously measuring 0.6 x 0.5 cm.   Gallbladder/Biliary Tree: Gallbladder sludge.  Spleen: Normal.  Pancreas: Normal.  Adrenal Glands: Normal.   Kidneys/Ureters: Normal.  Gastrointestinal: Normal.   Lymph Nodes: Normal.  Vessels: Normal.  Peritoneum/Retroperitoneum: Normal.  Bones/Soft Tissues: Spine degenerative changes.  IMPRESSION:      1. 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  2. No significant change in multiple other bilobar arterially enhancing lesions likely representing FNH's.  The images were reviewed and interpreted by Shiv Dumont MD.  Signature Line *** Final ***  Electronically Signed By:  Shiv Dumont IV on  06/15/2020 11:23  Dictated by:  Aydin Crow  Plan: 1. Labs and mri in a year seem reasonable. 2. Pt will see us in 1 yr. 3. Pt will see local heme.   Duration of the visit was 30 min with 10 min of chart prep reviewing data and information that was available for the visit and setting up in ecw and then an additional 20 min for the [facility]  telemed visit today with time spent reviewing said material and their clinical correlates and then with this information being used to formulate a treatment plan.

## 2024-08-01 ENCOUNTER — LAB OUTSIDE AN ENCOUNTER (OUTPATIENT)
Dept: URBAN - METROPOLITAN AREA TELEHEALTH 2 | Facility: TELEHEALTH | Age: 74
End: 2024-08-01

## 2024-08-09 ENCOUNTER — LAB OUTSIDE AN ENCOUNTER (OUTPATIENT)
Dept: URBAN - METROPOLITAN AREA TELEHEALTH 2 | Facility: TELEHEALTH | Age: 74
End: 2024-08-09

## 2024-08-17 ENCOUNTER — TELEPHONE ENCOUNTER (OUTPATIENT)
Dept: URBAN - METROPOLITAN AREA CLINIC 86 | Facility: CLINIC | Age: 74
End: 2024-08-17

## 2024-08-17 LAB
A/G RATIO: 1.9
ABSOLUTE BASOPHILS: 39
ABSOLUTE EOSINOPHILS: 99
ABSOLUTE LYMPHOCYTES: 1848
ABSOLUTE MONOCYTES: 429
ABSOLUTE NEUTROPHILS: 3086
ALBUMIN: 4.2
ALKALINE PHOSPHATASE: 68
ALT (SGPT): 14
AST (SGOT): 22
BASOPHILS: 0.7
BILIRUBIN, TOTAL: 0.6
BUN/CREATININE RATIO: (no result)
BUN: 13
CALCIUM: 8.7
CARBON DIOXIDE, TOTAL: 28
CHLORIDE: 103
CREATININE: 0.81
EGFR: 77
EOSINOPHILS: 1.8
GLOBULIN, TOTAL: 2.2
GLUCOSE: 96
HEMATOCRIT: 32.4
HEMOGLOBIN: 10.8
LYMPHOCYTES: 33.6
MCH: 33.3
MCHC: 33.3
MCV: 100
MONOCYTES: 7.8
MPV: 10.7
NEUTROPHILS: 56.1
PLATELET COUNT: 159
POTASSIUM: 3.6
PROTEIN, TOTAL: 6.4
RDW: 12.4
RED BLOOD CELL COUNT: 3.24
SODIUM: 141
WHITE BLOOD CELL COUNT: 5.5

## 2024-08-17 NOTE — HPI-TODAY'S VISIT:
Boaz Corrigan,  August 16 labs show glucose 96, BUN of 13, creatinine 0.81, sodium 141, potassium 3.6, chloride 103, CO2 28, calcium 8.7, albumin 4.3, bilirubin 0.6, alk phos 68, AST 22 and ALT of 14.  Ideal ALT less than 25.  WBC was 5.5 normal but hemoglobin remains a little low at 10.8 and was previously 10.9.  Hematocrit was a little low at 32.4.  MCV was upper normal at 100.  MCH was a little up at 33.3.  Ferritin was normal at 159.  Neutrophils and lymphocytes were normal.  Dr. Franklin

## 2024-08-20 ENCOUNTER — TELEPHONE ENCOUNTER (OUTPATIENT)
Dept: URBAN - METROPOLITAN AREA CLINIC 86 | Facility: CLINIC | Age: 74
End: 2024-08-20

## 2024-08-26 ENCOUNTER — TELEPHONE ENCOUNTER (OUTPATIENT)
Dept: URBAN - METROPOLITAN AREA CLINIC 86 | Facility: CLINIC | Age: 74
End: 2024-08-26

## 2024-08-26 NOTE — HPI-TODAY'S VISIT:
Dear Jane Corrigan,   August 26 MRI shows lower thorax normal. Liver shows no fat or iron and liver morphology appears normal. They compared the scan to your August 18, 2023 scan. Segment 7/8 of the liver shows a lesion with no uptake of uterus measuring 1.7 x 1.5 which is down from 1.9 x 1.5 cm before it felt to be an adenoma. Segment 8 arterially enhancing lesion measuring 1.8 x 1.1 and down from 1.3 x 1.9 with uptake of Eovist and felt to be an FNH.  Segment 8 lesion with uptake LINK-based measuring 0.8 cm and was previously 0.8 cm when remeasured for comparison. Gallbladder/biliary tree was normal as was spleen, pancreas, adrenal glands. Kidneys showed punctate left renal cyst with no size given. Bowel showed no obstruction. Lymph nodes were normal. Trace atherosclerotic disease was seen in your vessels without aortic aneurysm.  Important to follow-up on your cholesterol levels with local providers. Peritoneum retroperitoneum were normal and degenerative changes seen of the spine.   In summary, segment 7/8 hepatic adenoma continues to be slightly decreased versus 2023.  2 lesions were seen in segment 8 compatible with FNH but are stable to be decreased in size. We will go over this with you at your visit and discuss timing of the next scan. Dr. Franklin

## 2024-08-28 ENCOUNTER — OFFICE VISIT (OUTPATIENT)
Dept: URBAN - METROPOLITAN AREA TELEHEALTH 2 | Facility: TELEHEALTH | Age: 74
End: 2024-08-28
Payer: MEDICARE

## 2024-08-28 VITALS — WEIGHT: 137 LBS | HEIGHT: 67 IN | BODY MASS INDEX: 21.5 KG/M2

## 2024-08-28 DIAGNOSIS — D13.4 ADENOMA OF LIVER: ICD-10-CM

## 2024-08-28 DIAGNOSIS — R93.5 ABNORMAL ABDOMINAL ULTRASOUND: ICD-10-CM

## 2024-08-28 DIAGNOSIS — K76.89 LIVER LESION: ICD-10-CM

## 2024-08-28 DIAGNOSIS — C92.10 CML (CHRONIC MYELOCYTIC LEUKEMIA): ICD-10-CM

## 2024-08-28 PROCEDURE — 99214 OFFICE O/P EST MOD 30 MIN: CPT

## 2024-08-28 RX ORDER — IMATINIB MESYLATE 400 MG/1
1 TABLET WITH A MEAL AND A LARGE GLASS OF WATER TABLET ORAL ONCE A DAY
Status: ACTIVE | COMMUNITY

## 2024-08-28 RX ORDER — LEVOTHYROXINE SODIUM 88 UG/1
1 TABLET IN THE MORNING ON AN EMPTY STOMACH TABLET ORAL ONCE A DAY
Status: ACTIVE | COMMUNITY

## 2024-08-28 RX ORDER — PHENOL 1.4 %
AS DIRECTED AEROSOL, SPRAY (ML) MUCOUS MEMBRANE QD
Status: ACTIVE | COMMUNITY

## 2024-08-28 RX ORDER — CHOLECALCIFEROL (VITAMIN D3) 50 MCG
1 TABLET CAPSULE ORAL ONCE A DAY
Status: ACTIVE | COMMUNITY

## 2024-08-28 NOTE — HPI-TODAY'S VISIT:
Pt is 74 year old /White female, after a previous visit Aug 2023 for an evaluation for abnormal mri with liver lesions felt related to prior hormonal medication use.  Pt here for follow up. Still is n gleevac again and she is responding well and no signs of issues.  August 26 MRI shows lower thorax normal.  Liver shows no fat or iron and liver morphology appears normal. They compared the scan to your August 18, 2023 scan. Segment 7/8 of the liver shows a lesion with no uptake of uterus measuring 1.7 x 1.5 which is down from 1.9 x 1.5 cm before it felt to be an adenoma.  Segment 8 arterially enhancing lesion measuring 1.8 x 1.1 and down from 1.3 x 1.9 with uptake of Eovist and felt to be an FNH. Segment 8 lesion with uptake eovist-based measuring 0.8 cm and was previously 0.8 cm when remeasured for comparison.  Gallbladder/biliary tree was normal as was spleen, pancreas, adrenal glands.  Kidneys showed punctate left renal cyst with no size given.  Bowel showed no obstruction.  Lymph nodes were normal.  Trace atherosclerotic disease was seen in your vessels without aortic aneurysm. Important to follow-up on your cholesterol levels with local providers.  Peritoneum/retroperitoneum were normal and degenerative changes seen of the spine.  In summary, segment 7/8 hepatic adenoma continues to be slightly decreased versus 2023. 2 lesions were seen in segment 8 compatible with FNH but are stable to be decreased in size.  We will go over this with you at your visit and discuss timing of the next scan.   August 16 labs show glucose 96, BUN of 13, creatinine 0.81, sodium 141, potassium 3.6, chloride 103, CO2 28, calcium 8.7, albumin 4.3, bilirubin 0.6, alk phos 68, AST 22 and ALT of 14. Ideal ALT less than 25. WBC was 5.5 normal but hemoglobin remains a little low at 10.8 and was previously 10.9.  Hematocrit was a little low at 32.4.  MCV was upper normal at 100. MCH was a little up at 33.3. Ferritin was normal at 159. Neutrophils and lymphocytes were normal.  She is gleevec and cml and she is chronically anemic and due to the cml.   August 21 2023 MRI was compared to August 2022 MRI in July 2021 MRI. Lower thorax was normal. Liver showed no significant fat or iron deposition. SHe had a stable segment 7/8 1.9 x 1.5 cm lesion with mild washout and mild signal dropout in a stable segment eight 1.3 x 1.9 cm lesion as well. An additional stable segment 8 - 0.6 cm lesion was seen as well. You also had some layering sludge in your gallbladder. The spleen, pancreas, and adrenal glands were normal. Kidney showed an extrarenal pelvis on the right kidney. Gastrointestinal views, lymph node views, and vessels were normal. Peritoneum and retroperitoneum were normal as were soft tissues and bone views. In summary, you had a 7/8 stable hepatic hemangioma and stable other segment 8 hyperenhancing lesions which they thought could be FNH versus perfusional or less likely adenomas.  Off the ocp 15-16 yrs.  August 15 labs show glucose 94, BUN of 13, creatinine 0.3 sodium 142, potassium 3.6, chloride 103, CO2 of 28, calcium 8.6, albumin 4.1, bilirubin 0.7, alk phos 75, AST 24 and ALT of 16. Previously AST 20 and ALT 13 and ideal ALT less than 25 WBC 5.7 normal but RBC low at 3.33. Up from 3.19 last year. Hemoglobin low at 10.9 but up from 10.4 in August of last year. MCV normal 98.5. Plate count 1-2 normal. Neutrophils normal at 3614 and lymphocytes 1573. We look forward to seeing you at the visit. Please share labs with local providers.  CML is being managed by Dr Laureano as Dr Hernandez retired.  August 27 202 MRI Limited images through the lung bases appear unremarkable. Liver showed no significant diffuse fat or iron deposition. There is a 1.6 x 1.5 cm lesion in the right lobe that has some fat deposition and is favored to be an adenoma and is similar to more remote MRI of 2012 when it measured 1.3 x 1.5 cm. Scattered throughout the liver are other arterial phase hyperenhancing lesions for example 1.5 x 0.7 cm lesion in the right lobe which is similar to the most recent MRI. A 0.6 cm area seen in the right lobe also similar to most recent MRI. Gallbladder was present and has some sludge versus concentrated bile in it. No bile duct dilation seen. Spleen normal at 6.8 cm. Pancreas shows no focal lesion. Adrenal glands normal. Extrarenal pelvis seen to the right kidney and a few subcentimeter renal cysts also seen. Subcentimeter lymph nodes seen. Liver vessels patent. Degenerative changes seen of the spine. Overall they felt that you had similar to prior hyperenhancing lesions which are a combination of adenomas and FNH lesions and/or perfusional changes. We can discuss surveillance but have been doing the mris at 1 year intervals due to the stability seen.  Aug Glucose 97, creatinine 0.87, sodium 140, potassium 3.4, calcium 8.6, albumin 4.3, bilirubin 0.5, alkaline phosphatase 77, AST 29 ALT 13. The potassium and calcium are both slightly decreased please share with your primary care provider.The white blood cell count is 5.2 red blood cell count 3.19, hemoglobin 10.4, hematocrit 31.1, MCV 98, platelets 191, neutrophils 2.8 lymphocytes 1.8. The hemoglobin and red blood cell count is low and we will discuss this at your visit and also you need to share this with your primary care.  She is not on a water pill and so may be due to gleevec.   july 2021 labs: hgb 11.1, wbc 6.1, plt 210. cr 0.84, albumin 4.5, calcium low 8.6. ast 20, alt 10. alp 105, tb 0.4.   Prior mri submitted and they did addendum review: they confirmed 1.7cm lesion seen corresponded to similar sized lesion on the prior mri of June 2020 and was stable in size. Prior mri felt this lesion was an adenoma.   july 2021 MRI: July 24 MRI came back today. They compared it going back to the MRI of April 2017. Liver showed no fat or iron. No new suspicious lesion. Stable right lobe segment 8 lesion measuring 1.6 x 1.2 cm. Stable segment 6 lesion remains unchanged measuring 1.3 x 1.3 cm. Stable segment 4A lesion measuring 1.2 x 0.5 cm. Additional regions of arterial enhancement seen that were felt to be stable but no specific measurements given. Gallbladder and biliary tree normal. Spleen normal. Pancreas normal. Kidneys normal. Degenerative changes seen of the spine. Overall they felt that the segment 8 lesion was stable and was compatible with an adenoma. They mention that there was no significant change in the other lesions and that they were likely felt to be focal nodular hyperplasias.   Jan 20: u.s: the see liver vessels patent but they see hyperdynamic portal venous waveforms and is nonspecific. Right heart dysfunction suspected so recommend to see cardiologist or primary md to do echo to check this. 1.7cm lesion seen right lobe. Does not appear that they compared to a prior scan so need Rekha to get the [place] scan and drop off to them to get them to compare this. Gallbaldder unremarkable. Common bile duct 2.9mm. Imaged portions of pancreas unremarkable. Tail partially obscured by gas. Right kidney 10.4cm. Spleen 7cm. Prior mri [place] showed 1.6cm adenoma in seg 8 so likely same lesion and they just need to compare to this. Left you vmail re this.  She says cards saw her and was ok. Sees Jan 2023.   June 11 2020 ca 9.6 and glu 93 and bun 18 and tp 7.2 alb 4.6 and tb 0.7 and alk 88 and ast 25 and k 3.9 and na 141 cl 100 and cr 0.72 and alt 25 and co2 26. wbc 7.1 and hg 12 and plat 225.   April 2019 and wbc 6.6 and hg 11.1 and plat 198 and glu 100 and cr 0.84 and na 140 and k 3.7 and cl 100 and co2 23 and alb 4.4 and tb 0.6 and alk 83 and ast 20 and alt 9.  Gleevec was on for 7 yrs, She prior had stopped the gleevec in May 2020 and then told could wait or start back and she started back and staying on it and not detecting.   Document Type: MRI Abdomen w/ + w/o Contrast Document Date: June 13, 2020 07:45 Document Status: Auth (Verified) Document Title: MRI Abdomen w/ + w/o Contrast Performed By: Aydin Crow Verified By: Shiv Dumont IV on Lexie 15, 2020 11:23 Encounter info: [id number], [facility], Single Visit OP, 6/13/2020 - 6/13/2020   * Final Report *  Reason For Exam ADENOMA OF LIVER  REPORT EXAM: MRI Abdomen w/ + w/o Contrast  CLINICAL INDICATION: Adenoma of liver.  TECHNIQUE: Multisequence, multiplanar MRI of the abdomen was performed without and with intravenous contrast. ESRC.2.7.3  CONTRAST: 15 cc of Prohance  COMPARISON: MRI abdomen dated 4/20/2019 and 4/30/2017.  FINDINGS: Lower Thorax: Normal.  Liver: No fat or iron. No new suspicious lesions.  Stable right hepatic lobe, segment 8 T1 hypointense and T2 hyperintense arterially enhancing lesion measuring 1.6 x 1.3 cm (8:35), previously measuring 1.7 x 1.6 cm.. The lesion demonstrates washout and dropout on out of phase imaging and is most consistent with a hepatic adenoma.  Multiple other bilobar arterially enhancing lesions/foci, similar to prior, without identifiable fat. For example:  Segment 6 arterially enhancing lesion measures approximately 1.8 x 1.3 cm (10:58) with central T2 intensity in T1 hypointensity (possibly FNH or a scar), previously measuring 1.8 x 1.2 cm. No definite intralesional fat. Hepatic dome measures 0.6 x 0.4 cm (10:29), previously measuring 0.6 x 0.5 cm. Segment 4A arterially enhancing focus measures 0.5 x 0.3 cm (10:38), previously measuring 0.6 x 0.5 cm.  Gallbladder/Biliary Tree: Gallbladder sludge.  Spleen: Normal.  Pancreas: Normal.  Adrenal Glands: Normal.  Kidneys/Ureters: Normal.  Gastrointestinal: Normal.  Lymph Nodes: Normal.  Vessels: Normal.  Peritoneum/Retroperitoneum: Normal.  Bones/Soft Tissues: Spine degenerative changes.  IMPRESSION:  1. 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  2. No significant change in multiple other bilobar arterially enhancing lesions likely representing FNH's.  The images were reviewed and interpreted by Shiv Dumont MD.  Signature Line *** Final ***  Electronically Signed By: Shiv Dumont IV on 06/15/2020 11:23  Dictated by: Aydin Crow  Plan: 1. Labs and mri in a year again and good to see, 2. Pt will see us in a eyar.  3 Pt will see local heme for cml.   Duration of the visit was 33 min with 10 min of chart prep reviewing data and information that was available for the visit and setting up in ecw and then an additional 23 min for the [facility] telemed visit today with time spent reviewing said material and their clinical correlates and then with this information being used to formulate a treatment plan.

## 2025-08-04 ENCOUNTER — LAB OUTSIDE AN ENCOUNTER (OUTPATIENT)
Dept: URBAN - METROPOLITAN AREA TELEHEALTH 2 | Facility: TELEHEALTH | Age: 75
End: 2025-08-04

## 2025-08-07 ENCOUNTER — LAB OUTSIDE AN ENCOUNTER (OUTPATIENT)
Dept: URBAN - METROPOLITAN AREA TELEHEALTH 2 | Facility: TELEHEALTH | Age: 75
End: 2025-08-07

## 2025-08-23 ENCOUNTER — TELEPHONE ENCOUNTER (OUTPATIENT)
Dept: URBAN - METROPOLITAN AREA CLINIC 86 | Facility: CLINIC | Age: 75
End: 2025-08-23

## 2025-08-23 LAB
A/G RATIO: 1.8
ABSOLUTE BASOPHILS: 28
ABSOLUTE EOSINOPHILS: 110
ABSOLUTE LYMPHOCYTES: 1353
ABSOLUTE MONOCYTES: 501
ABSOLUTE NEUTROPHILS: 3509
ALBUMIN: 4.2
ALKALINE PHOSPHATASE: 83
ALT (SGPT): 12
AST (SGOT): 23
BASOPHILS: 0.5
BILIRUBIN, TOTAL: 0.7
BUN/CREATININE RATIO: (no result)
BUN: 12
CALCIUM: 8.6
CARBON DIOXIDE, TOTAL: 30
CHLORIDE: 100
CREATININE: 0.86
EGFR: 71
EOSINOPHILS: 2
GLOBULIN, TOTAL: 2.4
GLUCOSE: 101
HEMATOCRIT: 30.1
HEMOGLOBIN: 10.1
LYMPHOCYTES: 24.6
MCH: 33.3
MCHC: 33.6
MCV: 99.3
MONOCYTES: 9.1
MPV: 10.9
NEUTROPHILS: 63.8
PLATELET COUNT: 143
POTASSIUM: 3.6
PROTEIN, TOTAL: 6.6
RDW: 12.1
RED BLOOD CELL COUNT: 3.03
SODIUM: 138
WHITE BLOOD CELL COUNT: 5.5

## 2025-08-28 ENCOUNTER — OFFICE VISIT (OUTPATIENT)
Dept: URBAN - METROPOLITAN AREA TELEHEALTH 2 | Facility: TELEHEALTH | Age: 75
End: 2025-08-28
Payer: MEDICARE

## 2025-08-28 DIAGNOSIS — K82.8 GALLBLADDER SLUDGE: ICD-10-CM

## 2025-08-28 DIAGNOSIS — R93.5 ABNORMAL ABDOMINAL ULTRASOUND: ICD-10-CM

## 2025-08-28 DIAGNOSIS — K76.89 LIVER LESION: ICD-10-CM

## 2025-08-28 DIAGNOSIS — E55.9 VITAMIN D DEFICIENCY: ICD-10-CM

## 2025-08-28 DIAGNOSIS — D13.4 ADENOMA OF LIVER: ICD-10-CM

## 2025-08-28 DIAGNOSIS — C92.10 CML (CHRONIC MYELOCYTIC LEUKEMIA): ICD-10-CM

## 2025-08-28 DIAGNOSIS — E03.9 HYPOTHYROIDISM: ICD-10-CM

## 2025-08-28 DIAGNOSIS — Z79.899 HIGH RISK MEDICATION USE: ICD-10-CM

## 2025-08-28 DIAGNOSIS — Z71.89 VACCINE COUNSELING: ICD-10-CM

## 2025-08-28 PROCEDURE — 99214 OFFICE O/P EST MOD 30 MIN: CPT

## 2025-08-28 RX ORDER — LEVOTHYROXINE SODIUM 88 UG/1
1 TABLET IN THE MORNING ON AN EMPTY STOMACH TABLET ORAL ONCE A DAY
Status: ACTIVE | COMMUNITY

## 2025-08-28 RX ORDER — PHENOL 1.4 %
AS DIRECTED AEROSOL, SPRAY (ML) MUCOUS MEMBRANE QD
Status: ACTIVE | COMMUNITY

## 2025-08-28 RX ORDER — CHOLECALCIFEROL (VITAMIN D3) 50 MCG
1 TABLET CAPSULE ORAL ONCE A DAY
Status: ACTIVE | COMMUNITY

## 2025-08-28 RX ORDER — IMATINIB MESYLATE 400 MG/1
1 TABLET WITH A MEAL AND A LARGE GLASS OF WATER TABLET ORAL ONCE A DAY
Status: ACTIVE | COMMUNITY